# Patient Record
Sex: MALE | Race: WHITE
[De-identification: names, ages, dates, MRNs, and addresses within clinical notes are randomized per-mention and may not be internally consistent; named-entity substitution may affect disease eponyms.]

---

## 2020-06-13 ENCOUNTER — HOSPITAL ENCOUNTER (EMERGENCY)
Dept: HOSPITAL 53 - M ED | Age: 20
Discharge: HOME | End: 2020-06-13
Payer: COMMERCIAL

## 2020-06-13 VITALS — BODY MASS INDEX: 30.44 KG/M2 | HEIGHT: 68 IN | WEIGHT: 200.84 LBS

## 2020-06-13 VITALS — DIASTOLIC BLOOD PRESSURE: 55 MMHG | SYSTOLIC BLOOD PRESSURE: 116 MMHG

## 2020-06-13 DIAGNOSIS — S69.91XA: Primary | ICD-10-CM
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
## 2020-06-13 DIAGNOSIS — F17.200: ICD-10-CM
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
## 2020-06-13 NOTE — REP
Four views right fifth finger:  06/13/2020.

 

Indication:  Pain following injury.

 

Comparison:  None.

 

Findings:

 

There is no acute fracture, subluxation or dislocation.  No lytic or blastic

lesions are present.

 

Impression:

 

No acute fracture.

 

 

Electronically Signed by

Dario Caicedo DO 06/13/2020 04:01 P

## 2020-09-01 ENCOUNTER — HOSPITAL ENCOUNTER (OUTPATIENT)
Dept: HOSPITAL 53 - M LRY | Age: 20
End: 2020-09-01
Attending: PHYSICIAN ASSISTANT
Payer: COMMERCIAL

## 2020-09-01 DIAGNOSIS — Z11.3: Primary | ICD-10-CM

## 2020-09-01 LAB
CHLAMYDIA DNA AMPLIFICATION: NEGATIVE
HBV CORE IGM SER QL: NEGATIVE
HBV SURFACE AB SER-ACNC: NEGATIVE M[IU]/ML
HCV AB SER QL: 0.3 INDEX (ref ?–0.8)
HEPATITIS A ANTIBODY IGM: NEGATIVE
HIV 1+2 AB+HIV1 P24 AG SERPL QL IA: NEGATIVE
N GONORRHOEA RRNA SPEC QL NAA+PROBE: NEGATIVE

## 2020-09-03 LAB
HSV IGM TYPES 1&2: <0.91 RATIO (ref 0–0.9)
HSV1 IGG SER IA-ACNC: <0.91 INDEX (ref 0–0.9)
HSV2 IGG SER IA-ACNC: <0.91 INDEX (ref 0–0.9)